# Patient Record
Sex: MALE | Race: WHITE | NOT HISPANIC OR LATINO | Employment: OTHER | URBAN - METROPOLITAN AREA
[De-identification: names, ages, dates, MRNs, and addresses within clinical notes are randomized per-mention and may not be internally consistent; named-entity substitution may affect disease eponyms.]

---

## 2024-03-25 ENCOUNTER — OFFICE VISIT (OUTPATIENT)
Dept: FAMILY MEDICINE CLINIC | Facility: CLINIC | Age: 74
End: 2024-03-25
Payer: MEDICARE

## 2024-03-25 VITALS
WEIGHT: 141 LBS | SYSTOLIC BLOOD PRESSURE: 124 MMHG | OXYGEN SATURATION: 99 % | HEIGHT: 68 IN | BODY MASS INDEX: 21.37 KG/M2 | DIASTOLIC BLOOD PRESSURE: 78 MMHG | HEART RATE: 75 BPM | RESPIRATION RATE: 18 BRPM

## 2024-03-25 DIAGNOSIS — E78.2 MIXED HYPERLIPIDEMIA: ICD-10-CM

## 2024-03-25 DIAGNOSIS — F17.200 NICOTINE USE DISORDER: ICD-10-CM

## 2024-03-25 DIAGNOSIS — Z13.220 SCREENING, LIPID: Primary | ICD-10-CM

## 2024-03-25 DIAGNOSIS — R73.9 HYPERGLYCEMIA: ICD-10-CM

## 2024-03-25 DIAGNOSIS — Z12.11 SCREENING FOR COLON CANCER: ICD-10-CM

## 2024-03-25 RX ORDER — LATANOPROST 50 UG/ML
1 SOLUTION/ DROPS OPHTHALMIC NIGHTLY
COMMUNITY
Start: 2024-03-20

## 2024-03-25 RX ORDER — ASPIRIN 81 MG/1
81 TABLET ORAL DAILY
COMMUNITY

## 2024-03-25 NOTE — PROGRESS NOTES
"    Jersey Mercer DO  [facility] PRIMARY CARE  [address] [place] [address] [address]  [place] [address] [phone]    Subjective      Name Tyson De Guzman MRN [id number]    1950 AGE/SEX 73 y.o. / male      Chief Complaint Chief Complaint   Patient presents with    Establish Care     New patient here to establish care, has not been to a doctor in a 30 years         Visit History for  2024    History of Present Illness  Tyson De Guzman is a 73 y.o. male who presented today for Establish Care (New patient here to establish care, has not been to a doctor in a 30 years)  .    The patient presents today to establish care. He is accompanied by his brother.    Cataracts and Glaucoma  Today his brother states that he took him to the eye doctor, and he has cataracts and glaucoma. He is taking the drops at nighttime for glaucoma. On 2024, he is going to get new glasses. He is scheduled to see the surgeon for the cataracts on 2024. He has not seen a doctor since  or . He has never been sick.     Health Maintenance  He eats a lot of fish and chicken. He walks for exercise and uses hand weights. He has never had a colonoscopy. The patient denies any chest pain, problems breathing or lightheadedness.    He smokes about 6 cigars a day. He has 3 sons and 1 daughter.    His mother and father are still living.    The patient has no known allergies.      Medications and Allergies   Current Outpatient Medications   Medication Instructions    aspirin 81 mg, Oral, Daily    latanoprost (XALATAN) 0.005 % ophthalmic solution 1 drop, Both Eyes, Nightly     No Known Allergies   I have reviewed the above medications and allergies     Objective:      Vitals Vitals:    24 1320   BP: 124/78   BP Location: Left arm   Patient Position: Sitting   Cuff Size: Adult   Pulse: 75   Resp: 18   SpO2: 99%   Weight: 64 kg (141 lb)   Height: 172.7 cm (68\")     Body mass index is 21.44 kg/m².    Physical Exam  Vitals " reviewed.   Constitutional:       General: He is not in acute distress.     Appearance: He is not ill-appearing.   Cardiovascular:      Rate and Rhythm: Normal rate and regular rhythm.   Pulmonary:      Effort: Pulmonary effort is normal.      Breath sounds: Normal breath sounds.   Neurological:      Mental Status: He is alert.   Psychiatric:         Mood and Affect: Mood normal.         Behavior: Behavior normal.         Thought Content: Thought content normal.         Judgment: Judgment normal.            Assessment/Plan      Issues Addressed/ Plan   Diagnosis Plan   1. Screening, lipid        2. Hyperglycemia  Comprehensive metabolic panel    Lipid panel    CBC w AUTO Differential    Hemoglobin A1c      3. Nicotine use disorder  CBC w AUTO Differential      4. Screening for colon cancer  Ambulatory Referral For Screening Colonoscopy      5. Mixed hyperlipidemia  Lipid panel         There are no Patient Instructions on file for this visit.    Hypertension  His blood pressure is good. He does not meet the criteria for an EKG.     Healthcare Maintenance  I will obtain blood work to check his cholesterol, liver and kidney function. I will place an order for a colonoscopy. We may want to talk about doing a lung scan in the future.    BMI is within normal parameters. No other follow-up for BMI required.         Follow up  recommended Return in about 3 months (around 6/25/2024) for Annual physical.   - Dragon voice recognition software was utilized to complete this chart.  Every reasonable attempt was made to edit and correct the text, however some incorrect words may remain.   Jersey Mercer DO       Transcribed from ambient dictation for Jersey Mercer DO by Xin Acevedo.   03/25/24   14:37 EDT    Patient or patient representative verbalized consent to the visit recording.  I have personally performed the services described in this document as transcribed by the above individual, and it is both accurate and  complete.

## 2024-03-27 ENCOUNTER — LAB (OUTPATIENT)
Dept: LAB | Facility: HOSPITAL | Age: 74
End: 2024-03-27
Payer: MEDICARE

## 2024-03-27 PROCEDURE — 80053 COMPREHEN METABOLIC PANEL: CPT | Performed by: STUDENT IN AN ORGANIZED HEALTH CARE EDUCATION/TRAINING PROGRAM

## 2024-03-27 PROCEDURE — 85025 COMPLETE CBC W/AUTO DIFF WBC: CPT | Performed by: STUDENT IN AN ORGANIZED HEALTH CARE EDUCATION/TRAINING PROGRAM

## 2024-03-27 PROCEDURE — 80061 LIPID PANEL: CPT | Performed by: STUDENT IN AN ORGANIZED HEALTH CARE EDUCATION/TRAINING PROGRAM

## 2024-03-27 PROCEDURE — 83036 HEMOGLOBIN GLYCOSYLATED A1C: CPT | Performed by: STUDENT IN AN ORGANIZED HEALTH CARE EDUCATION/TRAINING PROGRAM

## 2024-04-07 RX ORDER — ATORVASTATIN CALCIUM 20 MG/1
20 TABLET, FILM COATED ORAL DAILY
Qty: 90 TABLET | Refills: 2 | Status: SHIPPED | OUTPATIENT
Start: 2024-04-07

## 2024-05-31 ENCOUNTER — TELEPHONE (OUTPATIENT)
Dept: FAMILY MEDICINE CLINIC | Facility: CLINIC | Age: 74
End: 2024-05-31
Payer: MEDICARE

## 2024-06-25 ENCOUNTER — OFFICE VISIT (OUTPATIENT)
Dept: FAMILY MEDICINE CLINIC | Facility: CLINIC | Age: 74
End: 2024-06-25
Payer: MEDICARE

## 2024-06-25 VITALS
SYSTOLIC BLOOD PRESSURE: 124 MMHG | HEART RATE: 83 BPM | RESPIRATION RATE: 18 BRPM | OXYGEN SATURATION: 95 % | HEIGHT: 68 IN | WEIGHT: 141 LBS | BODY MASS INDEX: 21.37 KG/M2 | DIASTOLIC BLOOD PRESSURE: 88 MMHG

## 2024-06-25 DIAGNOSIS — R73.9 HYPERGLYCEMIA: Primary | ICD-10-CM

## 2024-06-25 DIAGNOSIS — E78.2 MIXED HYPERLIPIDEMIA: ICD-10-CM

## 2024-06-25 PROCEDURE — 1159F MED LIST DOCD IN RCRD: CPT | Performed by: STUDENT IN AN ORGANIZED HEALTH CARE EDUCATION/TRAINING PROGRAM

## 2024-06-25 PROCEDURE — 99213 OFFICE O/P EST LOW 20 MIN: CPT | Performed by: STUDENT IN AN ORGANIZED HEALTH CARE EDUCATION/TRAINING PROGRAM

## 2024-06-25 PROCEDURE — 1160F RVW MEDS BY RX/DR IN RCRD: CPT | Performed by: STUDENT IN AN ORGANIZED HEALTH CARE EDUCATION/TRAINING PROGRAM

## 2024-06-25 NOTE — PROGRESS NOTES
"    Jersey Mercer DO  [facility] PRIMARY CARE  [facility]  [place] [id number] [phone]    Subjective      Name Tyson De Guzman MRN [id number]    1950 AGE/SEX 74 y.o. / male      Chief Complaint Chief Complaint   Patient presents with    Annual Exam     Here for annual exam          Visit History for  2024    Tyson De Guzman is a 74 y.o. male who presented today for Annual Exam (Here for annual exam )     History of Present Illness  The patient presents for evaluation of multiple medical concerns.    The patient's blood pressure is well-regulated.    The patient has been adhering to his prescribed cholesterol medication without any adverse effects.       Medications and Allergies   Current Outpatient Medications   Medication Instructions    aspirin 81 mg, Oral, Daily    atorvastatin (LIPITOR) 20 mg, Oral, Daily    latanoprost (XALATAN) 0.005 % ophthalmic solution 1 drop, Both Eyes, Nightly     No Known Allergies   I have reviewed the above medications and allergies     Objective:      Vitals Vitals:    24 1302   BP: 124/88   BP Location: Right arm   Patient Position: Sitting   Cuff Size: Adult   Pulse: 83   Resp: 18   SpO2: 95%   Weight: 64 kg (141 lb)   Height: 172.7 cm (68\")     Body mass index is 21.44 kg/m².    Physical Exam  Vitals reviewed.   Constitutional:       General: He is not in acute distress.     Appearance: He is not ill-appearing.   Pulmonary:      Effort: Pulmonary effort is normal.   Neurological:      Mental Status: He is alert.   Psychiatric:         Mood and Affect: Mood normal.         Behavior: Behavior normal.         Thought Content: Thought content normal.         Judgment: Judgment normal.        Physical Exam       Results  Laboratory Studies  Cholesterol levels were elevated.     Assessment/Plan   Issues Addressed/ Plan   Diagnosis Plan   1. Hyperglycemia        2. Mixed hyperlipidemia  Lipid panel    Comprehensive metabolic panel       "   Assessment & Plan  1. Hypertension  The patient's blood pressure is well-regulated. A reevaluation of the patient's cholesterol levels is planned.     BMI is within normal parameters. No other follow-up for BMI required.     There are no Patient Instructions on file for this visit.   Follow up  recommended Return in about 3 months (around 9/25/2024) for Annual physical.   - Dragon voice recognition software was utilized to complete this chart.  Every reasonable attempt was made to edit and correct the text, however some incorrect words may remain.   Jersey Mercer DO    Patient or patient representative verbalized consent for the use of Ambient Listening during the visit with  Jersey Mercer DO for chart documentation. 6/29/2024  17:43 EDT

## 2024-12-13 ENCOUNTER — OFFICE VISIT (OUTPATIENT)
Dept: FAMILY MEDICINE CLINIC | Facility: CLINIC | Age: 74
End: 2024-12-13
Payer: MEDICARE

## 2024-12-13 VITALS
RESPIRATION RATE: 16 BRPM | HEART RATE: 85 BPM | OXYGEN SATURATION: 96 % | WEIGHT: 143 LBS | SYSTOLIC BLOOD PRESSURE: 122 MMHG | DIASTOLIC BLOOD PRESSURE: 64 MMHG | HEIGHT: 68 IN | BODY MASS INDEX: 21.67 KG/M2

## 2024-12-13 DIAGNOSIS — E78.2 MIXED HYPERLIPIDEMIA: ICD-10-CM

## 2024-12-13 DIAGNOSIS — Z00.00 MEDICARE ANNUAL WELLNESS VISIT, SUBSEQUENT: Primary | ICD-10-CM

## 2024-12-13 PROCEDURE — G0439 PPPS, SUBSEQ VISIT: HCPCS | Performed by: STUDENT IN AN ORGANIZED HEALTH CARE EDUCATION/TRAINING PROGRAM

## 2024-12-13 PROCEDURE — 1160F RVW MEDS BY RX/DR IN RCRD: CPT | Performed by: STUDENT IN AN ORGANIZED HEALTH CARE EDUCATION/TRAINING PROGRAM

## 2024-12-13 PROCEDURE — 1159F MED LIST DOCD IN RCRD: CPT | Performed by: STUDENT IN AN ORGANIZED HEALTH CARE EDUCATION/TRAINING PROGRAM

## 2024-12-13 NOTE — PROGRESS NOTES
Subjective   The ABCs of the Annual Wellness Visit  Medicare Wellness Visit      Tyson De Guzman is a 74 y.o. patient who presents for a Medicare Wellness Visit.    The following portions of the patient's history were reviewed and   updated as appropriate: allergies, current medications, past family history, past medical history, past social history, past surgical history, and problem list.    Compared to one year ago, the patient's physical   health is the same.  Compared to one year ago, the patient's mental   health is the same.    Recent Hospitalizations:  He was not admitted to the hospital during the last year.     Current Medical Providers:  Patient Care Team:  Jersey Mercer DO as PCP - General (Family Medicine)    Outpatient Medications Prior to Visit   Medication Sig Dispense Refill    aspirin 81 MG EC tablet Take 1 tablet by mouth Daily.      atorvastatin (LIPITOR) 20 MG tablet Take 1 tablet by mouth Daily. 90 tablet 2    latanoprost (XALATAN) 0.005 % ophthalmic solution Administer 1 drop to both eyes Every Night. (Patient not taking: Reported on 12/13/2024)       No facility-administered medications prior to visit.     No opioid medication identified on active medication list. I have reviewed chart for other potential  high risk medication/s and harmful drug interactions in the elderly.      Aspirin is on active medication list. Aspirin use is indicated based on review of current medical condition/s. Pros and cons of this therapy have been discussed today. Benefits of this medication outweigh potential harm.  Patient has been encouraged to continue taking this medication.  .      There is no problem list on file for this patient.    Advance Care Planning Advance Directive is not on file.  ACP discussion was held with the patient during this visit. Patient does not have an advance directive, information provided.            Objective   Vitals:    12/13/24 1041   BP: 122/64   BP Location: Right arm   Patient  "Position: Sitting   Cuff Size: Adult   Pulse: 85   Resp: 16   SpO2: 96%   Weight: 64.9 kg (143 lb)   Height: 172.7 cm (68\")       Estimated body mass index is 21.74 kg/m² as calculated from the following:    Height as of this encounter: 172.7 cm (68\").    Weight as of this encounter: 64.9 kg (143 lb).    BMI is within normal parameters. No other follow-up for BMI required.       Does the patient have evidence of cognitive impairment? No                                                                                               Health  Risk Assessment    Smoking Status:  Social History     Tobacco Use   Smoking Status Every Day    Current packs/day: 0.50    Types: Cigarettes, Cigars    Passive exposure: Current   Smokeless Tobacco Never     Alcohol Consumption:  Social History     Substance and Sexual Activity   Alcohol Use Not Currently       Fall Risk Screen  STEADI Fall Risk Assessment has not been completed.    Depression Screening   Little interest or pleasure in doing things? Not at all   Feeling down, depressed, or hopeless? Not at all   PHQ-2 Total Score 0      Health Habits and Functional and Cognitive Screenin/13/2024    10:43 AM   Functional & Cognitive Status   Do you have difficulty preparing food and eating? No   Do you have difficulty bathing yourself, getting dressed or grooming yourself? No   Do you have difficulty using the toilet? No   Do you have difficulty moving around from place to place? No   Do you have trouble with steps or getting out of a bed or a chair? No   Current Diet Limited Junk Food   Dental Exam Up to date   Eye Exam Not up to date        Eye Exam Comment INSURANCE ISSUES, WILL GO AFTER NEW INSURANCE TAKES EFFECT IN JANUARY   Exercise (times per week) 7 times per week   Current Exercises Include Walking   Do you need help using the phone?  No   Are you deaf or do you have serious difficulty hearing?  Yes   Do you need help to go to places out of walking distance? No "   Do you need help shopping? No   Do you need help preparing meals?  No   Do you need help with housework?  No   Do you need help with laundry? No   Do you need help taking your medications? No   Do you need help managing money? No   Do you ever drive or ride in a car without wearing a seat belt? No   Have you felt unusual stress, anger or loneliness in the last month? No   Who do you live with? Sibling   If you need help, do you have trouble finding someone available to you? No   Do you have difficulty concentrating, remembering or making decisions? No           Age-appropriate Screening Schedule:  Refer to the list below for future screening recommendations based on patient's age, sex and/or medical conditions. Orders for these recommended tests are listed in the plan section. The patient has been provided with a written plan.    Health Maintenance List  Health Maintenance   Topic Date Due    Pneumococcal Vaccine 65+ (1 of 2 - PCV) Never done    TDAP/TD VACCINES (1 - Tdap) Never done    ZOSTER VACCINE (1 of 2) Never done    RSV Vaccine - Adults (1 - 1-dose 75+ series) Never done    HEPATITIS C SCREENING  Never done    ANNUAL WELLNESS VISIT  Never done    INFLUENZA VACCINE  Never done    COVID-19 Vaccine (2 - 2024-25 season) 09/01/2024    LIPID PANEL  03/27/2025                                                                                                                                                CMS Preventative Services Quick Reference  Risk Factors Identified During Encounter  None Identified    The above risks/problems have been discussed with the patient.  Pertinent information has been shared with the patient in the After Visit Summary.  An After Visit Summary and PPPS were made available to the patient.    Follow Up:   Next Medicare Wellness visit to be scheduled in 1 year.     Assessment & Plan  Medicare annual wellness visit, subsequent  Assessment & Plan  Hyperlipidemia.  His cholesterol levels are  elevated. He is currently taking atorvastatin and reports no issues with the medication. He is advised to continue taking atorvastatin daily with breakfast. He is encouraged to increase his physical activity by walking at least 30 minutes daily, starting with 10-15 minutes and gradually increasing the duration.    Smoking.  He smokes cigars, about 6 per day. He is advised to see a dentist at least once a year due to the risk of oral cancer and other oral health issues associated with cigar smoking. He is also encouraged to quit smoking.    Follow-up  The patient will follow up in 6 months.           Mixed hyperlipidemia       Orders:    Comprehensive Metabolic Panel    Lipid Panel         Follow Up:   Return in about 6 months (around 6/13/2025) for cholesterol.

## 2024-12-13 NOTE — LETTER
December 30, 2024    Tyson GRAY [address]  [place] [address]      Dear Mr. De Guzman      As your healthcare provider, we are committed to ensuring that you receive timely checkups and tests to keep you in good health.    Our records indicate that you did not have the following ordered tests or procedures completed: LABS     Please call the office at your earliest convenience to discuss next steps.  If you did have the ordered testing performed at a location other than [facility],   please call and have that facility send us the results.     Thank you for allowing us to take part in your healthcare. If you have any questions, please feel free to call us.      Sincerely,    [facility]  Jersey Mercer, DO

## 2025-03-10 ENCOUNTER — OFFICE VISIT (OUTPATIENT)
Dept: FAMILY MEDICINE CLINIC | Facility: CLINIC | Age: 75
End: 2025-03-10
Payer: MEDICARE

## 2025-03-10 VITALS
DIASTOLIC BLOOD PRESSURE: 88 MMHG | SYSTOLIC BLOOD PRESSURE: 138 MMHG | HEIGHT: 68 IN | RESPIRATION RATE: 18 BRPM | HEART RATE: 88 BPM | WEIGHT: 146 LBS | OXYGEN SATURATION: 96 % | BODY MASS INDEX: 22.13 KG/M2

## 2025-03-10 DIAGNOSIS — E78.2 MIXED HYPERLIPIDEMIA: ICD-10-CM

## 2025-03-10 DIAGNOSIS — E78.2 MIXED HYPERLIPIDEMIA: Primary | ICD-10-CM

## 2025-03-10 DIAGNOSIS — H25.9 AGE-RELATED CATARACT OF BOTH EYES, UNSPECIFIED AGE-RELATED CATARACT TYPE: ICD-10-CM

## 2025-03-10 PROCEDURE — 99213 OFFICE O/P EST LOW 20 MIN: CPT | Performed by: STUDENT IN AN ORGANIZED HEALTH CARE EDUCATION/TRAINING PROGRAM

## 2025-03-10 PROCEDURE — 1160F RVW MEDS BY RX/DR IN RCRD: CPT | Performed by: STUDENT IN AN ORGANIZED HEALTH CARE EDUCATION/TRAINING PROGRAM

## 2025-03-10 PROCEDURE — 1159F MED LIST DOCD IN RCRD: CPT | Performed by: STUDENT IN AN ORGANIZED HEALTH CARE EDUCATION/TRAINING PROGRAM

## 2025-03-10 RX ORDER — ATORVASTATIN CALCIUM 40 MG/1
40 TABLET, FILM COATED ORAL DAILY
Qty: 90 TABLET | Refills: 1 | Status: SHIPPED | OUTPATIENT
Start: 2025-03-10

## 2025-03-10 RX ORDER — ATORVASTATIN CALCIUM 20 MG/1
20 TABLET, FILM COATED ORAL DAILY
Qty: 90 TABLET | Refills: 0 | Status: SHIPPED | OUTPATIENT
Start: 2025-03-10 | End: 2025-03-10 | Stop reason: SDUPTHER

## 2025-03-10 RX ORDER — ATORVASTATIN CALCIUM 20 MG/1
20 TABLET, FILM COATED ORAL DAILY
Qty: 90 TABLET | Refills: 0 | Status: SHIPPED | OUTPATIENT
Start: 2025-03-10 | End: 2025-03-10

## 2025-03-10 NOTE — PROGRESS NOTES
"    Jersey Mercer DO  [facility] PRIMARY CARE  [address]  [place] [address] [phone]    Subjective      Name Tyson De Guzman MRN [id number]    1950 AGE/SEX 74 y.o. / male      Chief Complaint Chief Complaint   Patient presents with    Hyperlipidemia     Check up and refill         Visit History for  03/10/2025    Tyson De Guzman is a 74 y.o. male who presented today for Hyperlipidemia (Check up and refill)       History of Present Illness  He reports a diagnosis of cataracts, with the right eye being more severely affected than the left. He experiences fluid leakage from the right eye and is uncertain about his insurance coverage for cataract surgery. His vision in the left eye remains relatively good despite the presence of a cataract. His last ophthalmological examination was conducted in . He is a  and has [employer] Blue Shield insurance.    He has exhausted his supply of cholesterol medication and is seeking a refill. He has recently undergone laboratory tests at the hospital.    Additionally, he reports expectoration of a small amount of mucus, which he does not find bothersome. He is not experiencing any illness at present.       Medications and Allergies   Current Outpatient Medications   Medication Instructions    aspirin 81 mg, Daily    atorvastatin (LIPITOR) 40 mg, Oral, Daily    latanoprost (XALATAN) 0.005 % ophthalmic solution 1 drop, Nightly    Multiple Vitamins-Minerals (CENTRUM ADULT PO) Take  by mouth.     No Known Allergies   I have reviewed the above medications and allergies     Objective:      Vitals Vitals:    03/10/25 1257   BP: 138/88   BP Location: Left arm   Patient Position: Sitting   Cuff Size: Adult   Pulse: 88   Resp: 18   SpO2: 96%   Weight: 66.2 kg (146 lb)   Height: 172.7 cm (68\")     Body mass index is 22.2 kg/m².    Physical Exam  Vitals reviewed.   Constitutional:       General: He is not in acute distress.     Appearance: He is " not ill-appearing.   Cardiovascular:      Rate and Rhythm: Normal rate and regular rhythm.   Pulmonary:      Effort: Pulmonary effort is normal.      Breath sounds: Normal breath sounds.   Psychiatric:         Mood and Affect: Mood normal.         Behavior: Behavior normal.         Thought Content: Thought content normal.         Judgment: Judgment normal.          Physical Exam       Results  Laboratory Studies  Cholesterol levels were high.     Assessment/Plan   Issues Addressed/ Plan   Diagnosis Plan   1. Mixed hyperlipidemia  atorvastatin (LIPITOR) 40 MG tablet      2. Age-related cataract of both eyes, unspecified age-related cataract type  Ambulatory Referral to Ophthalmology         Assessment & Plan  1. Cataracts.  He reports worsening vision in the right eye compared to the left, with the right eye leaking fluid. He is unsure about his coverage for cataract surgery. He is advised to [facility] to [facility] benefits for cataract [facility]. A referral to an ophthalmologist will be initiated for further evaluation and potential surgical intervention.    2. Hypercholesterolemia.  His recent lab results indicate elevated cholesterol levels. A prescription for a 90-day supply of his cholesterol medication has been sent to his pharmacy. He is advised to continue taking the medication as prescribed. Follow-up labs will be conducted in approximately 6 months to monitor his cholesterol levels.    Follow-up  The patient will follow up in 6 months.     BMI is within normal parameters. No other follow-up for BMI required.     There are no Patient Instructions on file for this visit.   Follow up  recommended Return in about 6 months (around 9/10/2025).   - Dragon voice recognition software was utilized to complete this chart.  Every reasonable attempt was made to edit and correct the text, however some incorrect words may remain.   Jersey Mercer, DO    Patient or patient representative verbalized consent for the  use of Ambient Listening during the visit with  Jersey Mercer DO for chart documentation. 3/27/2025  20:45 EDT